# Patient Record
Sex: FEMALE | Race: WHITE
[De-identification: names, ages, dates, MRNs, and addresses within clinical notes are randomized per-mention and may not be internally consistent; named-entity substitution may affect disease eponyms.]

---

## 2018-12-13 ENCOUNTER — HOSPITAL ENCOUNTER (EMERGENCY)
Dept: HOSPITAL 25 - UCEAST | Age: 53
Discharge: HOME | End: 2018-12-13
Payer: SELF-PAY

## 2018-12-13 DIAGNOSIS — J40: Primary | ICD-10-CM

## 2018-12-13 PROCEDURE — G0463 HOSPITAL OUTPT CLINIC VISIT: HCPCS

## 2018-12-13 PROCEDURE — 99202 OFFICE O/P NEW SF 15 MIN: CPT

## 2023-10-10 NOTE — UC
Respiratory Complaint HPI





- HPI Summary


HPI Summary: 


Cough, pleuritic pain for 4 days w/ no sick contacts.  [place]  

[place].  Cough keeps her up at night.  Afebrile, denies headache, n/v, 

diarrhea.  she is about to see her 2 wks old grand son and worried she will 

pass to him.  has updated tdap, flu.





- History of Current Complaint


Chief Complaint: UCRespiratory


Stated Complaint: CHEST CONGESTION


Time Seen by Provider: 12/13/18 11:56


Hx Obtained From: Patient


Pregnant?: No


Onset/Duration: Sudden Onset


Pain Intensity: 1


Character: Cough: Productive


Aggravating Factors: Deep Breaths


Alleviating Factors: Nothing





- Risk Factors


Pulmonary Embolism Risk Factors: Recent Travel





- Allergies/Home Medications


Allergies/Adverse Reactions: 


 Allergies











Allergy/AdvReac Type Severity Reaction Status Date / Time


 


No Known Allergies Allergy   Verified 12/13/18 12:08











Home Medications: 


 Home Medications





Loratadine 10 mg PO DAILY 12/13/18 [History Confirmed 12/13/18]











PMH/Surg Hx/FS Hx/Imm Hx


Previously Healthy: Yes





- Surgical History


Surgical History: Yes


Surgery Procedure, Year, and Place: bowel





- Social History


Alcohol Use: Rare


Substance Use Type: None


Smoking Status (MU): Never Smoked Tobacco





Review of Systems


All Other Systems Reviewed And Are Negative: Yes


Constitutional: Positive: Negative


Skin: Positive: Negative


Respiratory: Positive: Cough


Cardiovascular: Positive: Negative


Gastrointestinal: Positive: Negative


Neurological: Positive: Negative


Psychological: Positive: Negative





Physical Exam


Triage Information Reviewed: Yes


Appearance: Well-Appearing


Vital Signs: 


 Initial Vital Signs











Temp  97 F   12/13/18 12:05


 


Pulse  88   12/13/18 12:05


 


Resp  16   12/13/18 12:05


 


BP  146/90   12/13/18 12:05


 


Pulse Ox  99   12/13/18 12:05











Vital Signs Reviewed: Yes


Eyes: Positive: Conjunctiva Clear


ENT: Positive: Pharynx normal, TMs normal


Neck: Positive: No Lymphadenopathy


Respiratory Exam: Normal


Cardiovascular Exam: Normal





[facility] Diagnostic Evaluation





- Laboratory


O2 Sat by Pulse Oximetry: 99





Respiratory Course/Dx





- Course


Course Of Treatment: Viral bronchitis; self limiting.  Lung exam nl, vitals 

good.  She insisted on zpack and explained that this may not help as it is of 

viral etiology.





- Differential Dx/Diagnosis


Differential Diagnosis/HQI/PQRI: Asthma, Bronchitis, Lower Resp Infection


Provider Diagnosis: 


 Bronchitis








Discharge





- Sign-Out/Discharge


Documenting (check all that apply): Patient Departure


All imaging exams completed and their final reports reviewed: No Studies





- Discharge Plan


Condition: Good


Disposition: HOME


Prescriptions: 


Acetaminoph/Cod 120/12 mg LIQ* [Tylenol/Codeine 120/12 LIQ*] 10 ml PO Q4H PRN #

120 udc MDD 100mL/24hr


 PRN Reason: Cough


Azithromycin TAB* [Zithromax TAB (Z-JOHNNY) 250 mg #6 tabs] 2 tab PO .TODAY, THEN 

1 DAILY #1 johnny


Patient Education Materials:  Acute Bronchitis (ED)


Referrals: 


No Primary Care Phys,NOPCP [Primary Care Provider] - 





- Billing Disposition and Condition


Condition: GOOD


Disposition: Home No.